# Patient Record
Sex: FEMALE | Race: BLACK OR AFRICAN AMERICAN | URBAN - METROPOLITAN AREA
[De-identification: names, ages, dates, MRNs, and addresses within clinical notes are randomized per-mention and may not be internally consistent; named-entity substitution may affect disease eponyms.]

---

## 2023-06-29 ENCOUNTER — OFFICE VISIT (OUTPATIENT)
Age: 68
End: 2023-06-29

## 2023-06-29 VITALS
TEMPERATURE: 98.1 F | HEART RATE: 56 BPM | WEIGHT: 201 LBS | DIASTOLIC BLOOD PRESSURE: 75 MMHG | SYSTOLIC BLOOD PRESSURE: 120 MMHG | OXYGEN SATURATION: 97 %

## 2023-06-29 DIAGNOSIS — S39.012A LUMBAR STRAIN, INITIAL ENCOUNTER: Primary | ICD-10-CM

## 2023-06-29 DIAGNOSIS — M54.50 ACUTE LEFT-SIDED LOW BACK PAIN WITHOUT SCIATICA: ICD-10-CM

## 2023-06-29 LAB
BILIRUBIN, POC: NEGATIVE
BLOOD URINE, POC: ABNORMAL
CLARITY, POC: CLEAR
COLOR, POC: YELLOW
GLUCOSE URINE, POC: NEGATIVE
KETONES, POC: NEGATIVE
LEUKOCYTE EST, POC: NEGATIVE
NITRITE, POC: NEGATIVE
PH, POC: 5.5
PROTEIN, POC: NEGATIVE
SPECIFIC GRAVITY, POC: 1.03
UROBILINOGEN, POC: 1

## 2023-06-29 RX ORDER — SULFAMETHOXAZOLE AND TRIMETHOPRIM 800; 160 MG/1; MG/1
1 TABLET ORAL 2 TIMES DAILY
COMMUNITY
End: 2023-06-29

## 2023-06-29 RX ORDER — HYDROCHLOROTHIAZIDE 25 MG/1
25 TABLET ORAL DAILY
COMMUNITY
Start: 2022-02-21

## 2023-06-29 RX ORDER — ATORVASTATIN CALCIUM 10 MG/1
10 TABLET, FILM COATED ORAL DAILY
COMMUNITY
Start: 2022-02-11

## 2023-06-29 RX ORDER — OXYBUTYNIN CHLORIDE 5 MG/1
5 TABLET ORAL 3 TIMES DAILY
COMMUNITY

## 2023-06-30 ASSESSMENT — ENCOUNTER SYMPTOMS
BACK PAIN: 1
BOWEL INCONTINENCE: 0

## 2025-02-01 ENCOUNTER — OFFICE VISIT (OUTPATIENT)
Age: 70
End: 2025-02-01

## 2025-02-01 VITALS
WEIGHT: 195 LBS | OXYGEN SATURATION: 97 % | TEMPERATURE: 97.7 F | DIASTOLIC BLOOD PRESSURE: 84 MMHG | HEART RATE: 58 BPM | SYSTOLIC BLOOD PRESSURE: 144 MMHG

## 2025-02-01 DIAGNOSIS — R03.0 ELEVATED BLOOD PRESSURE READING: ICD-10-CM

## 2025-02-01 DIAGNOSIS — N39.0 URINARY TRACT INFECTION WITHOUT HEMATURIA, SITE UNSPECIFIED: Primary | ICD-10-CM

## 2025-02-01 DIAGNOSIS — I10 ELEVATED BLOOD PRESSURE READING IN OFFICE WITH DIAGNOSIS OF HYPERTENSION: ICD-10-CM

## 2025-02-01 LAB
BILIRUBIN, POC: ABNORMAL
BLOOD URINE, POC: ABNORMAL
CLARITY, POC: CLEAR
COLOR, POC: YELLOW
GLUCOSE URINE, POC: ABNORMAL MG/DL
KETONES, POC: ABNORMAL MG/DL
LEUKOCYTE EST, POC: ABNORMAL
NITRITE, POC: POSITIVE
PH, POC: 7
PROTEIN, POC: ABNORMAL MG/DL
SPECIFIC GRAVITY, POC: 1.02
UROBILINOGEN, POC: 2 MG/DL

## 2025-02-01 NOTE — PROGRESS NOTES
Leslie Herrera (:  1955) is a 69 y.o. female,Established patient, here for evaluation of the following chief complaint(s):  Urinary Tract Infection (Odor with urination, back pain and frequency Symptoms began- 2 weeks ago )      ASSESSMENT/PLAN:    ICD-10-CM    1. Urinary tract infection without hematuria, site unspecified  N39.0 POCT Urinalysis no Micro        Results for orders placed or performed in visit on 25   POCT Urinalysis no Micro   Result Value Ref Range    Color, UA YELLOW     Clarity, UA CLEAR     Glucose, UA POC NEG mg/dL    Bilirubin, UA NEG     Ketones, UA NEG mg/dL    Spec Grav, UA 1.020     Blood, UA POC TRACE     pH, UA 7.0     Protein, UA POC TRACE mg/dL    Urobilinogen, UA 2.0 mg/dL    Leukocytes, UA SMALL     Nitrite, UA POSITIVE         Dx Disposition: UTI  Education and handout provided on diagnosis and management of symptoms.   AVS reviewed with patient. Follow up as needed in UC or with PCP for new or worsening symptoms.   No follow-ups on file.    SUBJECTIVE/OBJECTIVE:  Patient presents today with complaints of UTI symptoms that started 2 weeks ago      History provided by:  Patient   used: No    Urinary Tract Infection        Vitals:    25 1139   BP: (!) 144/81   Site: Left Upper Arm   Position: Sitting   Cuff Size: Medium Adult   Pulse: 58   Temp: 97.7 °F (36.5 °C)   SpO2: 97%   Weight: 88.5 kg (195 lb)       Review of Systems    Physical Exam  Constitutional:       Appearance: Normal appearance.   HENT:      Head: Normocephalic.      Nose: Nose normal.      Mouth/Throat:      Mouth: Mucous membranes are moist.      Pharynx: Oropharynx is clear.   Cardiovascular:      Rate and Rhythm: Normal rate and regular rhythm.      Heart sounds: Normal heart sounds.   Pulmonary:      Effort: Pulmonary effort is normal.      Breath sounds: Normal breath sounds.   Abdominal:      General: Abdomen is flat. Bowel sounds are normal. There is no distension.

## 2025-02-01 NOTE — PATIENT INSTRUCTIONS
Thank you for allowing us to care for you today and we hope you feel better soon  New Prescriptions    AMOXICILLIN-CLAVULANATE (AUGMENTIN) 875-125 MG PER TABLET    Take 1 tablet by mouth 2 times daily for 7 days

## 2025-02-04 DIAGNOSIS — N39.0 URINARY TRACT INFECTION WITHOUT HEMATURIA, SITE UNSPECIFIED: Primary | ICD-10-CM

## 2025-02-04 LAB
BACTERIA UR CULT: ABNORMAL
ORGANISM: ABNORMAL

## 2025-02-04 RX ORDER — NITROFURANTOIN 25; 75 MG/1; MG/1
100 CAPSULE ORAL 2 TIMES DAILY
Qty: 14 CAPSULE | Refills: 0 | Status: SHIPPED | OUTPATIENT
Start: 2025-02-04 | End: 2025-02-11

## 2025-02-05 ENCOUNTER — TELEPHONE (OUTPATIENT)
Age: 70
End: 2025-02-05